# Patient Record
Sex: FEMALE | Race: WHITE | NOT HISPANIC OR LATINO | Employment: OTHER | URBAN - METROPOLITAN AREA
[De-identification: names, ages, dates, MRNs, and addresses within clinical notes are randomized per-mention and may not be internally consistent; named-entity substitution may affect disease eponyms.]

---

## 2017-01-03 DIAGNOSIS — M81.0 OP (OSTEOPOROSIS): Primary | ICD-10-CM

## 2017-01-09 ENCOUNTER — OFFICE VISIT (OUTPATIENT)
Dept: RHEUMATOLOGY | Facility: CLINIC | Age: 72
End: 2017-01-09
Payer: MEDICARE

## 2017-01-09 ENCOUNTER — INFUSION (OUTPATIENT)
Dept: RHEUMATOLOGY | Facility: HOSPITAL | Age: 72
End: 2017-01-09
Attending: INTERNAL MEDICINE
Payer: MEDICARE

## 2017-01-09 VITALS
SYSTOLIC BLOOD PRESSURE: 115 MMHG | HEART RATE: 59 BPM | BODY MASS INDEX: 21.91 KG/M2 | WEIGHT: 123.69 LBS | DIASTOLIC BLOOD PRESSURE: 61 MMHG

## 2017-01-09 VITALS — SYSTOLIC BLOOD PRESSURE: 129 MMHG | DIASTOLIC BLOOD PRESSURE: 73 MMHG | HEART RATE: 52 BPM

## 2017-01-09 DIAGNOSIS — M81.0 OP (OSTEOPOROSIS): Primary | ICD-10-CM

## 2017-01-09 DIAGNOSIS — F03.90 DEMENTIA WITHOUT BEHAVIORAL DISTURBANCE, UNSPECIFIED DEMENTIA TYPE: ICD-10-CM

## 2017-01-09 PROBLEM — F03.918 DEMENTIA WITH BEHAVIORAL DISTURBANCE: Status: ACTIVE | Noted: 2017-01-09

## 2017-01-09 PROCEDURE — 1126F AMNT PAIN NOTED NONE PRSNT: CPT | Mod: S$GLB,,, | Performed by: PHYSICIAN ASSISTANT

## 2017-01-09 PROCEDURE — 25000003 PHARM REV CODE 250: Mod: PO | Performed by: PHYSICIAN ASSISTANT

## 2017-01-09 PROCEDURE — 99213 OFFICE O/P EST LOW 20 MIN: CPT | Mod: S$GLB,,, | Performed by: PHYSICIAN ASSISTANT

## 2017-01-09 PROCEDURE — 1159F MED LIST DOCD IN RCRD: CPT | Mod: S$GLB,,, | Performed by: PHYSICIAN ASSISTANT

## 2017-01-09 PROCEDURE — 63600175 PHARM REV CODE 636 W HCPCS: Mod: PO | Performed by: PHYSICIAN ASSISTANT

## 2017-01-09 PROCEDURE — 1157F ADVNC CARE PLAN IN RCRD: CPT | Mod: S$GLB,,, | Performed by: PHYSICIAN ASSISTANT

## 2017-01-09 PROCEDURE — 99999 PR PBB SHADOW E&M-EST. PATIENT-LVL III: CPT | Mod: PBBFAC,,, | Performed by: PHYSICIAN ASSISTANT

## 2017-01-09 PROCEDURE — 96365 THER/PROPH/DIAG IV INF INIT: CPT | Mod: PO

## 2017-01-09 PROCEDURE — 1160F RVW MEDS BY RX/DR IN RCRD: CPT | Mod: S$GLB,,, | Performed by: PHYSICIAN ASSISTANT

## 2017-01-09 RX ORDER — ZOLEDRONIC ACID 5 MG/100ML
5 INJECTION, SOLUTION INTRAVENOUS ONCE
Status: CANCELLED | OUTPATIENT
Start: 2017-01-09 | End: 2017-01-09

## 2017-01-09 RX ORDER — SODIUM CHLORIDE 0.9 % (FLUSH) 0.9 %
10 SYRINGE (ML) INJECTION
Status: CANCELLED | OUTPATIENT
Start: 2017-01-09

## 2017-01-09 RX ORDER — HEPARIN SODIUM (PORCINE) LOCK FLUSH IV SOLN 100 UNIT/ML 100 UNIT/ML
100 SOLUTION INTRAVENOUS
Status: CANCELLED | OUTPATIENT
Start: 2017-01-09

## 2017-01-09 RX ORDER — SODIUM CHLORIDE 0.9 % (FLUSH) 0.9 %
10 SYRINGE (ML) INJECTION
Status: DISCONTINUED | OUTPATIENT
Start: 2017-01-09 | End: 2017-01-09 | Stop reason: HOSPADM

## 2017-01-09 RX ORDER — ZOLEDRONIC ACID 5 MG/100ML
5 INJECTION, SOLUTION INTRAVENOUS ONCE
Status: COMPLETED | OUTPATIENT
Start: 2017-01-09 | End: 2017-01-09

## 2017-01-09 RX ADMIN — ZOLEDRONIC ACID 5 MG: 5 INJECTION, SOLUTION INTRAVENOUS at 09:01

## 2017-01-09 RX ADMIN — SODIUM CHLORIDE, PRESERVATIVE FREE 10 ML: 5 INJECTION INTRAVENOUS at 10:01

## 2017-01-09 NOTE — PROGRESS NOTES
Infusion# 2  Recent labs? today    Premeds? no    Reclast 5 mg administered IV at a 20 minute rate per orders; see MAR and vitals for more  details. Tolerated well without adverse events, discharged and ambulatory out of clinic.

## 2017-01-09 NOTE — PATIENT INSTRUCTIONS
Vitamin D3 1000units daily    Increase calcium in diet, dairy foods, milk, yogurt, cheese,     Return in July for new bone scan and appt to review bone scan with labs    Will continue reclast infusions for your bones for now, once yearly

## 2017-01-09 NOTE — MR AVS SNAPSHOT
[facility]  [address] [place] [address]  Phone: [phone]  Fax: [phone]                  Jeffery Rico   2017 10:00 AM   Infusion    Description:  Female : 1945   Provider:  RHEUMATOLOGY, INFUSION   Department:  [facility]           Diagnoses this Visit        Comments    OP (osteoporosis)    -  Primary            To Do List           Future Appointments        Provider Department Dept Phone    7/10/2017 8:30 AM LABORATORY, [facility] [phone]    7/10/2017 9:00 AM [facility] [phone]    7/10/2017 9:30 AM Aristeo Albrecht MD [facility] Rheumatology [phone]      Goals (5 Years of Data)     None      [facility]     [facility] -  Assistance  Registered nurses in the [facility] provide clinical advisement, health education, appointment booking, and other advisory services.  Call for this free service at [phone].             Medications           Message regarding Medications     Verify the changes and/or additions to your medication regime listed below are the same as discussed with your clinician today.  If any of these changes or additions are incorrect, please notify your healthcare provider.        These medications were administered today        Dose Freq    zoledronic acid-mannitol & water 5 mg/100 mL infusion 5 mg 5 mg Once    Sig: Inject 100 mLs (5 mg total) into the vein once.    Class: Normal    Route: Intravenous    Non-formulary Exception Code: Specific indication for non-formulary alternative    sodium chloride 0.9% flush 10 mL (Discontinued) 10 mL As needed (PRN)    Sig: Inject 10 mLs into the vein as needed for Line Care.    Class: Normal    Route: Intravenous    Reason for Discontinue: Patient Discharge    Non-formulary Exception Code: Specific indication for non-formulary alternative           Verify that  the below list of medications is an accurate representation of the medications you are currently taking.  If none reported, the list may be blank. If incorrect, please contact your healthcare provider. Carry this list with you in case of emergency.           Current Medications     atorvastatin (LIPITOR) 40 MG tablet Take 1 tablet (40 mg total) by mouth once daily.    b complex vitamins tablet Take 1 tablet by mouth once daily.    donepezil (ARICEPT) 10 MG tablet Take 10 mg by mouth every evening.    fluticasone (FLONASE) 50 mcg/actuation nasal spray USE TWO SPRAY(S) IN EACH NOSTRIL ONCE DAILY    levothyroxine (SYNTHROID) 75 MCG tablet TAKE ONE TABLET BY MOUTH ONCE DAILY BEFORE BREAKFAST    montelukast (SINGULAIR) 10 mg tablet TAKE ONE TABLET BY MOUTH IN THE EVENING    NAMENDA 10 mg Tab TAKE ONE-HALF TABLET BY MOUTH ONCE A DAY FOR 2 WEEKS, THEN ONE TABLET ONCE DAILY    NAMENDA XR 28 mg CSpX            Clinical Reference Information           Vital Signs - Last Recorded  Most recent update: 1/9/2017 11:34 AM by Chyna Fox RN    BP Pulse                129/73 (!) 52          Allergies as of 1/9/2017     Donepezil    Codeine      Immunizations Administered on Date of Encounter - 1/9/2017     None      Administrations This Visit     sodium chloride 0.9% flush 10 mL     Admin Date Action Dose Route Administered By             01/09/2017 Given 10 mL Intravenous Chyna Fox RN                    zoledronic acid-mannitol & water 5 mg/100 mL infusion 5 mg     Admin Date Action Dose Rate Route Administered By          01/09/2017 New Bag 5 mg 200 mL/hr Intravenous Chyna Fox RN

## 2017-01-09 NOTE — PROGRESS NOTES
Subjective:       Patient ID: Jeffery Rico is a 71 y.o. female.    Chief Complaint: Osteoporosis    HPI Comments: Mrs. Gil is a 72 yo female here for osteoporosis follow up.  SHe was last seen in October 2015 by Dr Amin. At that time she was started on Reclast for her osteoporosis.  She has a history of intolerance to oral bisphosphates-gerd, GI pain.   Her last dexa was done 7/2015 and showed OP with a neck mean of -2.8, Dual femur -2.4, spine -3.4.  She is not on vitamin D Daily.  She was lost to follow up and was due for reclast 2nd dose in October of 2016.  No falls or fractures since we have seen her last.  No complaints today.  She does have dementia and is here with her daughter who said she couldn't remember who her daughter was this morning.  Here for reclast infusion today.  New dexa is due this year.      Review of Systems   Constitutional: Negative for fatigue and fever.   HENT: Positive for congestion.    Eyes:        Right eye vision impairment   Respiratory:        Recent bronchitis, improving   Cardiovascular: Negative.    Gastrointestinal:        Gerd   Genitourinary: Negative.    Musculoskeletal: Negative for arthralgias and joint swelling.   Skin: Negative.    Psychiatric/Behavioral:        Dementia, memory loss         Objective:     Visit Vitals    /61    Pulse (!) 59    Wt 56.1 kg (123 lb 10.9 oz)    BMI 21.91 kg/m2        Physical Exam   Constitutional: She is well-developed, well-nourished, and in no distress.   HENT:   Head: Normocephalic and atraumatic.   Neck: Normal range of motion. Neck supple.   Cardiovascular: Normal rate and regular rhythm.    Pulmonary/Chest: Effort normal and breath sounds normal. No respiratory distress.   Abdominal: Soft.   Neurological: She is alert.   Skin: No rash noted. No erythema.     Musculoskeletal: Normal range of motion. She exhibits no edema or deformity.   Ambulates without assistance, able to get in and out of chair well  without using hands, steady gait           Recent Results (from the past 168 hour(s))   Comprehensive metabolic panel    Collection Time: 01/09/17  8:50 AM   Result Value Ref Range    Sodium 138 136 - 145 mmol/L    Potassium 4.4 3.5 - 5.1 mmol/L    Chloride 103 95 - 110 mmol/L    CO2 27 23 - 29 mmol/L    Glucose 111 (H) 70 - 110 mg/dL    BUN, Bld 14 8 - 23 mg/dL    Creatinine 0.8 0.5 - 1.4 mg/dL    Calcium 9.7 8.7 - 10.5 mg/dL    Total Protein 7.5 6.0 - 8.4 g/dL    Albumin 3.8 3.5 - 5.2 g/dL    Total Bilirubin 0.5 0.1 - 1.0 mg/dL    Alkaline Phosphatase 66 55 - 135 U/L    AST 22 10 - 40 U/L    ALT 26 10 - 44 U/L    Anion Gap 8 8 - 16 mmol/L    eGFR if African American >60 >60 mL/min/1.73 m^2    eGFR if non African American >60 >60 mL/min/1.73 m^2          Assessment:       1. OP (osteoporosis)    2. Dementia without behavioral disturbance, unspecified dementia type          Osteoporosis:  Reclast infusion number 2 today.  Dexa 7/2015 with neck mean -2.8, Dual femur -2.4, spine -3.4, next dexa due this year in July  Dementia:  On Aricept and namenda, memory loss   Plan:       1.  Ok for reclast today and in 12 mos  2.  Start taking Vit D 1000units daily and calcium through diet  3. Return in 6 mos with new Dexa and cmp    Call with any questions/concerns  Reviewed, JOSE YANEZ MD

## 2017-01-18 DIAGNOSIS — E03.9 HYPOTHYROIDISM (ACQUIRED): ICD-10-CM

## 2017-01-19 RX ORDER — LEVOTHYROXINE SODIUM 75 UG/1
TABLET ORAL
Qty: 30 TABLET | Refills: 0 | OUTPATIENT
Start: 2017-01-19

## 2017-02-28 DIAGNOSIS — E03.9 HYPOTHYROIDISM (ACQUIRED): ICD-10-CM

## 2017-02-28 RX ORDER — LEVOTHYROXINE SODIUM 75 UG/1
TABLET ORAL
Qty: 30 TABLET | Refills: 0 | OUTPATIENT
Start: 2017-02-28

## 2017-03-17 DIAGNOSIS — E03.9 HYPOTHYROIDISM (ACQUIRED): ICD-10-CM

## 2017-03-20 DIAGNOSIS — E03.9 HYPOTHYROIDISM (ACQUIRED): ICD-10-CM

## 2017-03-21 RX ORDER — LEVOTHYROXINE SODIUM 75 UG/1
TABLET ORAL
Qty: 30 TABLET | Refills: 0 | OUTPATIENT
Start: 2017-03-21

## 2017-03-21 RX ORDER — LEVOTHYROXINE SODIUM 75 UG/1
75 TABLET ORAL
Qty: 30 TABLET | Refills: 6 | OUTPATIENT
Start: 2017-03-21

## 2017-07-21 ENCOUNTER — TELEPHONE (OUTPATIENT)
Dept: RHEUMATOLOGY | Facility: CLINIC | Age: 72
End: 2017-07-21

## 2017-07-21 NOTE — TELEPHONE ENCOUNTER
----- Message from Constance Thao sent at 7/21/2017  1:35 PM CDT -----  Contact: Tia/daughter  Pt needs to reschedule her apps that were scheduled on 07/10/17. Pt wants to come in on Monday, 07/24. Pls call Tia back at [phone].

## 2017-07-24 ENCOUNTER — APPOINTMENT (OUTPATIENT)
Dept: RADIOLOGY | Facility: CLINIC | Age: 72
End: 2017-07-24
Attending: INTERNAL MEDICINE
Payer: MEDICARE

## 2017-07-24 DIAGNOSIS — M81.0 OP (OSTEOPOROSIS): ICD-10-CM

## 2017-07-24 PROCEDURE — 77080 DXA BONE DENSITY AXIAL: CPT | Mod: TC,PO

## 2017-07-24 PROCEDURE — 77080 DXA BONE DENSITY AXIAL: CPT | Mod: 26,,, | Performed by: INTERNAL MEDICINE

## 2017-07-24 NOTE — TELEPHONE ENCOUNTER
Spoke with patient via telephone while patient was at the  and scheduled her followup appointment for 08/02/2017 at 3:30pm with Dr. Albrecht.

## 2017-08-28 ENCOUNTER — OFFICE VISIT (OUTPATIENT)
Dept: RHEUMATOLOGY | Facility: CLINIC | Age: 72
End: 2017-08-28
Payer: MEDICARE

## 2017-08-28 VITALS
SYSTOLIC BLOOD PRESSURE: 134 MMHG | DIASTOLIC BLOOD PRESSURE: 58 MMHG | HEART RATE: 70 BPM | WEIGHT: 132.06 LBS | BODY MASS INDEX: 23.39 KG/M2

## 2017-08-28 DIAGNOSIS — M81.8 OTHER OSTEOPOROSIS WITHOUT CURRENT PATHOLOGICAL FRACTURE: Primary | ICD-10-CM

## 2017-08-28 DIAGNOSIS — Z51.81 MEDICATION MONITORING ENCOUNTER: ICD-10-CM

## 2017-08-28 PROCEDURE — 99214 OFFICE O/P EST MOD 30 MIN: CPT | Mod: S$GLB,,, | Performed by: PHYSICIAN ASSISTANT

## 2017-08-28 PROCEDURE — 1159F MED LIST DOCD IN RCRD: CPT | Mod: S$GLB,,, | Performed by: PHYSICIAN ASSISTANT

## 2017-08-28 PROCEDURE — 1126F AMNT PAIN NOTED NONE PRSNT: CPT | Mod: S$GLB,,, | Performed by: PHYSICIAN ASSISTANT

## 2017-08-28 PROCEDURE — 99999 PR PBB SHADOW E&M-EST. PATIENT-LVL III: CPT | Mod: PBBFAC,,, | Performed by: PHYSICIAN ASSISTANT

## 2017-08-28 PROCEDURE — 3008F BODY MASS INDEX DOCD: CPT | Mod: S$GLB,,, | Performed by: PHYSICIAN ASSISTANT

## 2017-08-28 NOTE — PROGRESS NOTES
Subjective:       Patient ID: Jeffery Rico is a 71 y.o. female.    Chief Complaint: osteoporosis    Mrs. Gil is a 72 yo female here for osteoporosis follow up. She was started on Reclast for her osteoporosis in oct 2015.  She has a history of intolerance to oral bisphosphates-gerd, GI pain.   Her last dexa was done 7/2015 and showed OP with a neck mean of -2.8, Dual femur -2.4, spine -3.4.   She was lost to follow up and received her second reclast infusion Jan 2017.   No falls or fractures since we have seen her last.  No complaints today.  New dexa done today showing improving bmd at the hip, stable bmd at the spine. She is on vitamin D Daily over the counter.       Review of Systems   Constitutional: Negative for chills, fatigue and fever.   HENT: Negative for congestion, mouth sores, rhinorrhea and sore throat.    Eyes: Negative for pain and redness.        Right eye vision impairment   Respiratory: Negative for cough and shortness of breath.    Cardiovascular: Negative.  Negative for chest pain.   Gastrointestinal: Negative for abdominal pain, constipation, diarrhea, nausea and vomiting.        Gerd   Genitourinary: Negative.  Negative for dysuria and hematuria.   Musculoskeletal: Negative for arthralgias, joint swelling and myalgias.   Skin: Negative.  Negative for rash.   Neurological: Negative for weakness, numbness and headaches.   Psychiatric/Behavioral: The patient is not nervous/anxious.         Dementia, memory loss         Objective:     BP (!) 134/58   Pulse 70   Wt 59.9 kg (132 lb 0.9 oz)   BMI 23.39 kg/m²      Physical Exam   Constitutional: She is well-developed, well-nourished, and in no distress.   HENT:   Head: Normocephalic and atraumatic.   Neck: Normal range of motion. Neck supple.   Cardiovascular: Normal rate and regular rhythm.    Pulmonary/Chest: Effort normal and breath sounds normal. No respiratory distress.   Abdominal: Soft.   Neurological: She is alert.   Skin: No rash  noted. No erythema.     Musculoskeletal: Normal range of motion. She exhibits no edema or deformity.   Ambulates without assistance, able to get in and out of chair well without using hands, steady gait         Results for CHRISTINA BILLS (MRN [id number]) as of 8/28/2017 15:16   Ref. Range 7/24/2017 13:12 7/24/2017 13:15   Sodium Latest Ref Range: 136 - 145 mmol/L  140   Potassium Latest Ref Range: 3.5 - 5.1 mmol/L  3.9   Chloride Latest Ref Range: 95 - 110 mmol/L  104   CO2 Latest Ref Range: 23 - 29 mmol/L  28   Anion Gap Latest Ref Range: 8 - 16 mmol/L  8   BUN, Bld Latest Ref Range: 8 - 23 mg/dL  22   Creatinine Latest Ref Range: 0.5 - 1.4 mg/dL  0.8   eGFR if non African American Latest Ref Range: >60 mL/min/1.73 m^2  >60   eGFR if  Latest Ref Range: >60 mL/min/1.73 m^2  >60   Glucose Latest Ref Range: 70 - 110 mg/dL  89   Calcium Latest Ref Range: 8.7 - 10.5 mg/dL  9.5   Alkaline Phosphatase Latest Ref Range: 55 - 135 U/L  53 (L)   Total Protein Latest Ref Range: 6.0 - 8.4 g/dL  7.4   Albumin Latest Ref Range: 3.5 - 5.2 g/dL  3.8   Total Bilirubin Latest Ref Range: 0.1 - 1.0 mg/dL  0.3   AST Latest Ref Range: 10 - 40 U/L  19   ALT Latest Ref Range: 10 - 44 U/L  18   Vit D, 25-Hydroxy Latest Ref Range: 30 - 96 ng/mL 34      No results found for this or any previous visit (from the past 168 hour(s)).  Dexa 7/2015 with neck mean -2.8, Dual femur -2.4, spine -3.4  Dexa 7.24.2017: DF -2.3, neck mean -2.7, spine -3.1, improving bmp  Assessment:       1. Other osteoporosis without current pathological fracture    2. Medication monitoring encounter        Impression:  Osteoporosis: prev intolerant to oral bisphosphonate; Reclast infusion x2 doses (last 1/9/2017); improving bmd on new dexa  Mediation monitoring; had no issues with reclast, kidney funciton normal  Dementia:  On Aricept and namenda, memory loss --no issues today, seems to understand   Plan:       Continue reclast infusions yearly, due [place]  2018 for 3rd dose, anticipate completing 6 treatments  Repeat dexa 7/2019  Continue Vit D 1000units daily and calcium through diet    Bring back end of Jan for Reclast #3, bmp  Call with any questions/concerns

## 2017-08-28 NOTE — PATIENT INSTRUCTIONS
Vitamin D 1000units daily over the counter    Continue daily calcium through your diet     We will continue reclast in January

## 2017-10-20 ENCOUNTER — PATIENT OUTREACH (OUTPATIENT)
Dept: ADMINISTRATIVE | Facility: HOSPITAL | Age: 72
End: 2017-10-20

## 2017-10-20 NOTE — PROGRESS NOTES
HALLIE for daughter-Tia-(pt states her kids take care of her drs and appts) advising over due for annual exam and mammo.requested she call for appt

## 2017-12-07 DIAGNOSIS — M81.8 OTHER OSTEOPOROSIS WITHOUT CURRENT PATHOLOGICAL FRACTURE: Primary | ICD-10-CM

## 2017-12-07 DIAGNOSIS — Z51.81 MEDICATION MONITORING ENCOUNTER: ICD-10-CM

## 2017-12-07 RX ORDER — SODIUM CHLORIDE 0.9 % (FLUSH) 0.9 %
10 SYRINGE (ML) INJECTION
Status: CANCELLED | OUTPATIENT
Start: 2017-12-07

## 2017-12-07 RX ORDER — ZOLEDRONIC ACID 5 MG/100ML
5 INJECTION, SOLUTION INTRAVENOUS
Status: CANCELLED | OUTPATIENT
Start: 2017-12-07

## 2018-01-29 ENCOUNTER — PATIENT OUTREACH (OUTPATIENT)
Dept: ADMINISTRATIVE | Facility: HOSPITAL | Age: 73
End: 2018-01-29

## 2018-01-29 NOTE — PROGRESS NOTES
I have attempted without success to contact this patient by phone to schedule annual exam to include tetanus, zoster, and influenza vaccinations, colonoscopy, and lipid panel. Patient not available, left voicemail.

## 2018-02-05 RX ORDER — ATORVASTATIN CALCIUM 40 MG/1
TABLET, FILM COATED ORAL
Qty: 90 TABLET | Refills: 0 | OUTPATIENT
Start: 2018-02-05

## 2018-02-23 NOTE — PROGRESS NOTES
"Subjective:       Patient ID: Jeffery Rico is a 72 y.o. female.    Chief Complaint: osteoporosis    Mrs. Gil is a 73 yo female here for osteoporosis follow up. She was started on Reclast for her osteoporosis in oct 2015. She missed a year somehow but resumed infusion. She is due for #3 today.  She has a history of intolerance to oral bisphosphates-gerd, GI pain.   Her last dexa was done 8/2017 and showed improving bmd at the hips, stable at the spine.   No falls or fractures since we have seen her last.  No complaints today.  She is on vitamin D Daily over the counter. She has dementia but answers all my questions today, does have issues with memory. She is here with her son, Luis Fernando. She recently moved into an assisted living facility type of place to help better take care of her due to her dementia.      Review of Systems   Constitutional: Negative for chills, fatigue and fever.   HENT: Negative for congestion, mouth sores, rhinorrhea and sore throat.    Eyes: Negative for pain and redness.        Right eye vision impairment   Respiratory: Negative for cough and shortness of breath.    Cardiovascular: Negative.  Negative for chest pain.   Gastrointestinal: Negative for abdominal pain, constipation, diarrhea, nausea and vomiting.        Gerd   Genitourinary: Negative.  Negative for dysuria and hematuria.   Musculoskeletal: Negative for arthralgias, joint swelling and myalgias.   Skin: Negative.  Negative for rash.   Neurological: Negative for weakness, numbness and headaches.   Psychiatric/Behavioral: The patient is not nervous/anxious.         Dementia, memory loss         Objective:     BP (!) 106/58   Pulse 65   Ht 5' 3" (1.6 m)   Wt 55.4 kg (122 lb 2.2 oz)   BMI 21.64 kg/m²      Physical Exam   Constitutional: She is well-developed, well-nourished, and in no distress.   HENT:   Head: Normocephalic and atraumatic.   Neck: Normal range of motion. Neck supple.   Cardiovascular: Normal rate and " regular rhythm.    Pulmonary/Chest: Effort normal and breath sounds normal. No respiratory distress.   Abdominal: Soft.   Neurological: She is alert.   Skin: No rash noted. No erythema.     Musculoskeletal: Normal range of motion. She exhibits no edema or deformity.   Ambulates without assistance, able to get in and out of chair well without using hands, steady gait           Recent Results (from the past 168 hour(s))   Comprehensive metabolic panel    Collection Time: 02/26/18 12:56 PM   Result Value Ref Range    Sodium 139 136 - 145 mmol/L    Potassium 3.6 3.5 - 5.1 mmol/L    Chloride 102 95 - 110 mmol/L    CO2 24 23 - 29 mmol/L    Glucose 183 (H) 70 - 110 mg/dL    BUN, Bld 12 8 - 23 mg/dL    Creatinine 1.0 0.5 - 1.4 mg/dL    Calcium 9.0 8.7 - 10.5 mg/dL    Total Protein 7.1 6.0 - 8.4 g/dL    Albumin 3.9 3.5 - 5.2 g/dL    Total Bilirubin 0.4 0.1 - 1.0 mg/dL    Alkaline Phosphatase 72 55 - 135 U/L    AST 23 10 - 40 U/L    ALT 34 10 - 44 U/L    Anion Gap 13 8 - 16 mmol/L    eGFR if African American >60 >60 mL/min/1.73 m^2    eGFR if non African American 56 (A) >60 mL/min/1.73 m^2     Dexa 7/2015 with neck mean -2.8, Dual femur -2.4, spine -3.4  Dexa 7.24.2017: DF -2.3, neck mean -2.7, spine -3.1, improving bmp  Assessment:       1. Other osteoporosis without current pathological fracture    2. Medication monitoring encounter    3. Dementia without behavioral disturbance, unspecified dementia type        Impression:  Osteoporosis: prev intolerant to oral bisphosphonate; Reclast infusion x2 doses (last 1/9/2017); improving bmd on new dexa, due for #3 today     Mediation monitoring; had no issues with reclast, kidney function normal    Dementia:  On Aricept and namenda, memory loss --no issues today, seems to understand, here with her son  Plan:       Continue reclast infusions yearly, due today for 3rd dose, anticipate completing 6 treatments  Repeat dexa 7/2019 or after  Continue Vit D 1000units daily and calcium  through diet    Bring back end of feb 2019 for Reclast #4, bmp  Call with any questions/concerns

## 2018-02-26 ENCOUNTER — INFUSION (OUTPATIENT)
Dept: RHEUMATOLOGY | Facility: HOSPITAL | Age: 73
End: 2018-02-26
Attending: PHYSICIAN ASSISTANT
Payer: MEDICARE

## 2018-02-26 ENCOUNTER — OFFICE VISIT (OUTPATIENT)
Dept: RHEUMATOLOGY | Facility: CLINIC | Age: 73
End: 2018-02-26
Payer: MEDICARE

## 2018-02-26 VITALS
BODY MASS INDEX: 21.64 KG/M2 | SYSTOLIC BLOOD PRESSURE: 120 MMHG | DIASTOLIC BLOOD PRESSURE: 62 MMHG | HEART RATE: 66 BPM | WEIGHT: 122.13 LBS

## 2018-02-26 VITALS
HEART RATE: 65 BPM | SYSTOLIC BLOOD PRESSURE: 106 MMHG | HEIGHT: 63 IN | BODY MASS INDEX: 21.64 KG/M2 | DIASTOLIC BLOOD PRESSURE: 58 MMHG | WEIGHT: 122.13 LBS

## 2018-02-26 DIAGNOSIS — Z51.81 MEDICATION MONITORING ENCOUNTER: ICD-10-CM

## 2018-02-26 DIAGNOSIS — M81.8 OTHER OSTEOPOROSIS WITHOUT CURRENT PATHOLOGICAL FRACTURE: Primary | ICD-10-CM

## 2018-02-26 DIAGNOSIS — F03.90 DEMENTIA WITHOUT BEHAVIORAL DISTURBANCE, UNSPECIFIED DEMENTIA TYPE: ICD-10-CM

## 2018-02-26 PROCEDURE — A4216 STERILE WATER/SALINE, 10 ML: HCPCS | Mod: PO | Performed by: PHYSICIAN ASSISTANT

## 2018-02-26 PROCEDURE — 1126F AMNT PAIN NOTED NONE PRSNT: CPT | Mod: S$GLB,,, | Performed by: PHYSICIAN ASSISTANT

## 2018-02-26 PROCEDURE — 25000003 PHARM REV CODE 250: Mod: PO | Performed by: PHYSICIAN ASSISTANT

## 2018-02-26 PROCEDURE — 1159F MED LIST DOCD IN RCRD: CPT | Mod: S$GLB,,, | Performed by: PHYSICIAN ASSISTANT

## 2018-02-26 PROCEDURE — 63600175 PHARM REV CODE 636 W HCPCS: Mod: PO | Performed by: PHYSICIAN ASSISTANT

## 2018-02-26 PROCEDURE — 3008F BODY MASS INDEX DOCD: CPT | Mod: S$GLB,,, | Performed by: PHYSICIAN ASSISTANT

## 2018-02-26 PROCEDURE — 99214 OFFICE O/P EST MOD 30 MIN: CPT | Mod: S$GLB,,, | Performed by: PHYSICIAN ASSISTANT

## 2018-02-26 PROCEDURE — 96365 THER/PROPH/DIAG IV INF INIT: CPT | Mod: PO

## 2018-02-26 PROCEDURE — 99999 PR PBB SHADOW E&M-EST. PATIENT-LVL IV: CPT | Mod: PBBFAC,,, | Performed by: PHYSICIAN ASSISTANT

## 2018-02-26 RX ORDER — AZELASTINE 1 MG/ML
SPRAY, METERED NASAL
COMMUNITY
Start: 2018-01-25

## 2018-02-26 RX ORDER — BENZONATATE 100 MG/1
CAPSULE ORAL
COMMUNITY
Start: 2018-01-25

## 2018-02-26 RX ORDER — SODIUM CHLORIDE 0.9 % (FLUSH) 0.9 %
10 SYRINGE (ML) INJECTION
Status: CANCELLED | OUTPATIENT
Start: 2018-02-26

## 2018-02-26 RX ORDER — MEMANTINE HYDROCHLORIDE 28 MG/1
CAPSULE, EXTENDED RELEASE ORAL
COMMUNITY
Start: 2017-11-29

## 2018-02-26 RX ORDER — ZOLEDRONIC ACID 5 MG/100ML
INJECTION, SOLUTION INTRAVENOUS
COMMUNITY

## 2018-02-26 RX ORDER — DONEPEZIL HYDROCHLORIDE 23 MG/1
23 TABLET, FILM COATED ORAL
COMMUNITY
Start: 2017-11-29

## 2018-02-26 RX ORDER — ATORVASTATIN CALCIUM 40 MG/1
40 TABLET, FILM COATED ORAL
COMMUNITY
Start: 2018-02-20 | End: 2019-02-20

## 2018-02-26 RX ORDER — MEMANTINE HYDROCHLORIDE 28 MG/1
1 CAPSULE, EXTENDED RELEASE ORAL
COMMUNITY
Start: 2017-04-20

## 2018-02-26 RX ORDER — CITALOPRAM 10 MG/1
TABLET ORAL
COMMUNITY
Start: 2018-02-05

## 2018-02-26 RX ORDER — METHYLPREDNISOLONE ACETATE 80 MG/ML
80 INJECTION, SUSPENSION INTRA-ARTICULAR; INTRALESIONAL; INTRAMUSCULAR; SOFT TISSUE
COMMUNITY
Start: 2017-01-13

## 2018-02-26 RX ORDER — ATORVASTATIN CALCIUM 40 MG/1
40 TABLET, FILM COATED ORAL
COMMUNITY
Start: 2017-07-21 | End: 2018-07-21

## 2018-02-26 RX ORDER — ACETAMINOPHEN 500 MG
TABLET ORAL
COMMUNITY

## 2018-02-26 RX ORDER — SODIUM CHLORIDE 0.9 % (FLUSH) 0.9 %
10 SYRINGE (ML) INJECTION
Status: DISCONTINUED | OUTPATIENT
Start: 2018-02-26 | End: 2018-02-26 | Stop reason: HOSPADM

## 2018-02-26 RX ORDER — LORATADINE 10 MG/1
10 TABLET ORAL
COMMUNITY
Start: 2017-04-20 | End: 2018-04-20

## 2018-02-26 RX ORDER — TRIAMCINOLONE ACETONIDE 40 MG/ML
40 INJECTION, SUSPENSION INTRA-ARTICULAR; INTRAMUSCULAR
COMMUNITY
Start: 2017-01-13

## 2018-02-26 RX ORDER — DONEPEZIL HYDROCHLORIDE 10 MG/1
10 TABLET, FILM COATED ORAL
COMMUNITY
Start: 2016-07-14

## 2018-02-26 RX ORDER — ALPRAZOLAM 0.25 MG/1
TABLET ORAL
COMMUNITY
Start: 2018-02-05

## 2018-02-26 RX ORDER — DONEPEZIL HYDROCHLORIDE 23 MG/1
TABLET, FILM COATED ORAL
COMMUNITY
Start: 2018-02-10

## 2018-02-26 RX ORDER — CITALOPRAM 10 MG/1
10 TABLET ORAL
COMMUNITY
Start: 2018-02-05 | End: 2019-02-05

## 2018-02-26 RX ORDER — ZOLEDRONIC ACID 5 MG/100ML
5 INJECTION, SOLUTION INTRAVENOUS
Status: COMPLETED | OUTPATIENT
Start: 2018-02-26 | End: 2018-02-26

## 2018-02-26 RX ORDER — AZELASTINE 1 MG/ML
1 SPRAY, METERED NASAL
COMMUNITY
Start: 2018-01-25 | End: 2019-01-25

## 2018-02-26 RX ORDER — ZOLEDRONIC ACID 5 MG/100ML
5 INJECTION, SOLUTION INTRAVENOUS
Status: CANCELLED | OUTPATIENT
Start: 2018-02-26

## 2018-02-26 RX ORDER — LEVOTHYROXINE SODIUM 75 UG/1
75 TABLET ORAL
COMMUNITY
Start: 2017-04-20

## 2018-02-26 RX ADMIN — ZOLEDRONIC ACID 5 MG: 5 INJECTION, SOLUTION INTRAVENOUS at 01:02

## 2018-02-26 RX ADMIN — SODIUM CHLORIDE, PRESERVATIVE FREE 10 ML: 5 INJECTION INTRAVENOUS at 01:02

## 2018-02-26 NOTE — PROGRESS NOTES
Infusion# 3  S/S infection noted or voiced? denies  Recent labs? yes    Premeds? no    Reclast 5 mg administered IV at a 20 minute rate per orders; see MAR and vitals for more  details. Tolerated well without adverse events, discharged and ambulatory out of clinic.

## 2018-02-26 NOTE — PATIENT INSTRUCTIONS
Zoledronic Acid injection (Paget's Disease, Osteoporosis)  What is this medicine?  ZOLEDRONIC ACID (SAMARIA le dron ik AS id) lowers the amount of calcium loss from bone. It is used to treat Paget's disease and osteoporosis in women.  How should I use this medicine?  This medicine is for infusion into a vein. It is given by a health care professional in a hospital or clinic setting.  Talk to your pediatrician regarding the use of this medicine in children. This medicine is not approved for use in children.  What side effects may I notice from receiving this medicine?  Side effects that you should report to your doctor or health care professional as soon as possible:  · allergic reactions like skin rash, itching or hives, swelling of the face, lips, or tongue  · anxiety, confusion, or depression  · breathing problems  · changes in vision  · eye pain  · feeling faint or lightheaded, falls  · jaw pain, especially after dental work  · mouth sores  · muscle cramps, stiffness, or weakness  · redness, blistering, peeling or loosening of the skin, including inside the mouth  · trouble passing urine or change in the amount of urine  Side effects that usually do not require medical attention (report to your doctor or health care professional if they continue or are bothersome):  · bone, joint, or muscle pain  · constipation  · diarrhea  · fever  · hair loss  · irritation at site where injected  · loss of appetite  · nausea, vomiting  · stomach upset  · trouble sleeping  · trouble swallowing  · weak or tired  What may interact with this medicine?  · certain antibiotics given by injection  · NSAIDs, medicines for pain and inflammation, like ibuprofen or naproxen  · some diuretics like bumetanide, furosemide  · teriparatide  What if I miss a dose?  It is important not to miss your dose. Call your doctor or health care professional if you are unable to keep an appointment.  Where should I keep my medicine?  This drug is given in a  hospital or clinic and will not be stored at home.  What should I tell my health care provider before I take this medicine?  They need to know if you have any of these conditions:  · aspirin-sensitive asthma  · cancer, especially if you are receiving medicines used to treat cancer  · dental disease or wear dentures  · infection  · kidney disease  · low levels of calcium in the blood  · past surgery on the parathyroid gland or intestines  · receiving corticosteroids like dexamethasone or prednisone  · an unusual or allergic reaction to zoledronic acid, other medicines, foods, dyes, or preservatives  · pregnant or trying to get pregnant  · breast-feeding  What should I watch for while using this medicine?  Visit your doctor or health care professional for regular checkups. It may be some time before you see the benefit from this medicine. Do not stop taking your medicine unless your doctor tells you to. Your doctor may order blood tests or other tests to see how you are doing.  Women should inform their doctor if they wish to become pregnant or think they might be pregnant. There is a potential for serious side effects to an unborn child. Talk to your health care professional or pharmacist for more information.  You should make sure that you get enough calcium and vitamin D while you are taking this medicine. Discuss the foods you eat and the vitamins you take with your health care professional.  Some people who take this medicine have severe bone, joint, and/or muscle pain. This medicine may also increase your risk for jaw problems or a broken thigh bone. Tell your doctor right away if you have severe pain in your jaw, bones, joints, or muscles. Tell your doctor if you have any pain that does not go away or that gets worse.  Tell your dentist and dental surgeon that you are taking this medicine. You should not have major dental surgery while on this medicine. See your dentist to have a dental exam and fix any dental  problems before starting this medicine. Take good care of your teeth while on this medicine. Make sure you see your dentist for regular follow-up appointments.  NOTE:This sheet is a summary. It may not cover all possible information. If you have questions about this medicine, talk to your doctor, pharmacist, or health care provider. Copyright© 2017 [employer]    WAYS TO HELP PREVENT INFECTION         WASH YOUR HANDS OFTEN DURING THE DAY, ESPECIALLY BEFORE YOU EAT, AFTER USING THE BATHROOM, AND AFTER TOUCHING ANIMALS     STAY AWAY FROM PEOPLE WHO HAVE ILLNESSES YOU CAN CATCH; SUCH AS COLDS, FLU, CHICKEN POX     TRY TO AVOID CROWDS     STAY AWAY FROM CHILDREN WHO RECENTLY HAVE RECEIVED LIVE VIRUS VACCINES     MAINTAIN GOOD MOUTH CARE     DO NOT SQUEEZE OR SCRATCH PIMPLES     CLEAN CUTS & SCRAPES RIGHT AWAY AND DAILY UNTIL HEALED WITH WARM WATER, SOAP & AN ANTISEPTIC     AVOID CONTACT WITH LITTER BOXES, BIRD CAGES, & FISH TANKS     AVOID STANDING WATER, IE., BIRD BATHS, FLOWER POTS/VASES, OR HUMIDIFIERS     WEAR GLOVES WHEN GARDENING OR CLEANING UP AFTER OTHERS, ESPECIALLY BABIES & SMALL CHILDREN     DO NOT EAT RAW FISH, SEAFOOD, MEAT, OR EGGS

## 2018-04-26 ENCOUNTER — PATIENT MESSAGE (OUTPATIENT)
Dept: INTERNAL MEDICINE | Facility: CLINIC | Age: 73
End: 2018-04-26

## 2018-05-10 ENCOUNTER — PATIENT OUTREACH (OUTPATIENT)
Dept: ADMINISTRATIVE | Facility: HOSPITAL | Age: 73
End: 2018-05-10

## 2018-05-10 NOTE — PROGRESS NOTES
I have attempted without success to contact this patient by phone to schedule annual mammogram exam, hyperlipidemia, and other health maintenance. Patient not available, left voicemail.